# Patient Record
Sex: MALE | Race: OTHER | HISPANIC OR LATINO | URBAN - METROPOLITAN AREA
[De-identification: names, ages, dates, MRNs, and addresses within clinical notes are randomized per-mention and may not be internally consistent; named-entity substitution may affect disease eponyms.]

---

## 2024-05-28 ENCOUNTER — TELEMEDICINE (OUTPATIENT)
Dept: MEDICAL GROUP | Age: 24
End: 2024-05-28
Payer: COMMERCIAL

## 2024-05-28 VITALS — BODY MASS INDEX: 26.6 KG/M2 | WEIGHT: 190 LBS | HEIGHT: 71 IN | RESPIRATION RATE: 18 BRPM

## 2024-05-28 DIAGNOSIS — E78.00 PURE HYPERCHOLESTEROLEMIA: ICD-10-CM

## 2024-05-28 DIAGNOSIS — Z00.00 ENCOUNTER FOR MEDICAL EXAMINATION TO ESTABLISH CARE: ICD-10-CM

## 2024-05-28 DIAGNOSIS — Z00.00 ANNUAL PHYSICAL EXAM: ICD-10-CM

## 2024-05-28 PROCEDURE — 99203 OFFICE O/P NEW LOW 30 MIN: CPT | Performed by: FAMILY MEDICINE

## 2024-05-28 RX ORDER — METHYLPHENIDATE HYDROCHLORIDE 54 MG/1
54 TABLET ORAL
COMMUNITY
Start: 2024-05-21

## 2024-05-28 RX ORDER — METHYLPHENIDATE HYDROCHLORIDE 20 MG/1
20 TABLET ORAL
COMMUNITY
Start: 2024-05-21

## 2024-05-28 RX ORDER — FLUOXETINE HYDROCHLORIDE 20 MG/1
20 CAPSULE ORAL DAILY
COMMUNITY
Start: 2024-04-15

## 2024-05-28 ASSESSMENT — PATIENT HEALTH QUESTIONNAIRE - PHQ9
SUM OF ALL RESPONSES TO PHQ QUESTIONS 1-9: 5
5. POOR APPETITE OR OVEREATING: 0 - NOT AT ALL
CLINICAL INTERPRETATION OF PHQ2 SCORE: 2

## 2024-05-28 NOTE — PROGRESS NOTES
"Virtual Visit: Established Patient   This visit was conducted via Zoom using secure and encrypted videoconferencing technology.   The patient was in their home in the [facility].    The patient's identity was confirmed and verbal consent was obtained for this virtual visit.     Subjective:   CC:   Chief Complaint   Patient presents with    [facility]     NEW TO DEVIN Grimes is a 23 y.o. male presenting for evaluation and management of: [facility] ovidio Abebe is a very pleasant 23-year-old male who presents to clinic to [facility].  He has a significant past medical history of ADHD and depression.    Today the patient denied any fevers, chills, headaches, nausea, vomiting, changes in vision, shortness of breath, back pain, chest pain, nausea or vomiting, change in taste or smell, new rashes, joint pain, blood in stool dark tarry stool.      Past medical history see above and below    Family History of Cancer---none    Family History of early CAD (female for the age of 65, or a male before the age of 55 )---none      Social History        Occupation----works at [employer]    Exercise---at times      Pets---1 dog named pepper Dawson    Favorite sports team--- boxing      ROS   See above and below    Current medicines (including changes today)  Current Outpatient Medications   Medication Sig Dispense Refill    FLUoxetine (PROZAC) 20 MG Cap Take 20 mg by mouth every day.      methylphenidate (RITALIN) 20 MG tablet Take 20 mg by mouth.      methylphenidate (CONCERTA) 54 MG CR tablet Take 54 mg by mouth.       No current facility-administered medications for this visit.       There are no problems to display for this patient.       Objective:   Resp 18   Ht 1.803 m (5' 11\")   Wt 86.2 kg (190 lb)   BMI 26.50 kg/m²     Physical Exam:  Constitutional: Alert, no distress, well-groomed.  Skin: No rashes in visible areas.  Eye: Round. Conjunctiva clear, lids normal. No icterus.   ENMT: Lips pink " without lesions, good dentition, moist mucous membranes. Phonation normal.  Neck: No masses, no thyromegaly. Moves freely without pain.  Respiratory: Unlabored respiratory effort, no cough or audible wheeze  Psych: Alert and oriented x3, normal affect and mood.     Assessment and Plan:   The following treatment plan was discussed:     1. Encounter for medical examination to establish care    Care has been established  We need  updated l abs to establish a clinical profile      Age-appropriate preventive services recommended by USPTF guidelines and ACIP were discussed today.  Requested any outside Medical records to be sent to us    Discussed seat belt safety    2. Annual labs  - CBC WITH DIFFERENTIAL; Future  - Comp Metabolic Panel; Future  - Lipid Profile; Future  - TSH+FREE T4  - T3 FREE; Future  - HEP C VIRUS ANTIBODY; Future  - HIV AG/AB COMBO ASSAY SCREENING; Future    3. Pure hypercholesterolemia  - CBC WITH DIFFERENTIAL; Future  - Comp Metabolic Panel; Future  - Lipid Profile; Future  - TSH+FREE T4  - T3 FREE; Future  - HEP C VIRUS ANTIBODY; Future  - HIV AG/AB COMBO ASSAY SCREENING; Future    Other orders  - FLUoxetine (PROZAC) 20 MG Cap; Take 20 mg by mouth every day.  - methylphenidate (RITALIN) 20 MG tablet; Take 20 mg by mouth.  - methylphenidate (CONCERTA) 54 MG CR tablet; Take 54 mg by mouth.      Follow-up: Return in about 6 months (around 11/28/2024) for Reevaluation, labs.

## 2024-07-15 DIAGNOSIS — F41.1 GAD (GENERALIZED ANXIETY DISORDER): ICD-10-CM

## 2024-07-15 RX ORDER — FLUOXETINE HYDROCHLORIDE 40 MG/1
40 CAPSULE ORAL DAILY
Qty: 90 CAPSULE | Refills: 1 | Status: SHIPPED | OUTPATIENT
Start: 2024-07-15

## 2024-07-25 DIAGNOSIS — F90.2 ATTENTION DEFICIT HYPERACTIVITY DISORDER (ADHD), COMBINED TYPE: ICD-10-CM

## 2024-07-25 RX ORDER — METHYLPHENIDATE HYDROCHLORIDE 54 MG/1
54 TABLET ORAL EVERY MORNING
Qty: 90 TABLET | Refills: 0 | Status: SHIPPED | OUTPATIENT
Start: 2024-07-25 | End: 2024-07-30 | Stop reason: SDUPTHER

## 2024-07-30 DIAGNOSIS — F90.2 ATTENTION DEFICIT HYPERACTIVITY DISORDER (ADHD), COMBINED TYPE: ICD-10-CM

## 2024-07-30 RX ORDER — METHYLPHENIDATE HYDROCHLORIDE 54 MG/1
54 TABLET ORAL EVERY MORNING
Qty: 30 TABLET | Refills: 0 | Status: SHIPPED | OUTPATIENT
Start: 2024-09-28 | End: 2024-10-28

## 2024-07-30 RX ORDER — METHYLPHENIDATE HYDROCHLORIDE 54 MG/1
54 TABLET ORAL EVERY MORNING
Qty: 90 TABLET | Refills: 0 | Status: CANCELLED | OUTPATIENT
Start: 2024-07-30 | End: 2024-10-28

## 2024-07-30 RX ORDER — METHYLPHENIDATE HYDROCHLORIDE 54 MG/1
54 TABLET ORAL EVERY MORNING
Qty: 30 TABLET | Refills: 0 | Status: SHIPPED | OUTPATIENT
Start: 2024-08-01 | End: 2024-08-31

## 2024-07-30 RX ORDER — METHYLPHENIDATE HYDROCHLORIDE 54 MG/1
54 TABLET ORAL EVERY MORNING
Qty: 30 TABLET | Refills: 0 | Status: SHIPPED | OUTPATIENT
Start: 2024-07-30 | End: 2024-08-29

## 2024-08-09 RX ORDER — METHYLPHENIDATE HYDROCHLORIDE 20 MG/1
20 TABLET ORAL
Qty: 60 TABLET | OUTPATIENT
Start: 2024-08-09

## 2024-08-09 RX ORDER — METHYLPHENIDATE HYDROCHLORIDE 20 MG/1
20 TABLET ORAL
Qty: 60 TABLET | Status: CANCELLED | OUTPATIENT
Start: 2024-08-09

## 2024-08-16 ENCOUNTER — OFFICE VISIT (OUTPATIENT)
Dept: MEDICAL GROUP | Age: 24
End: 2024-08-16
Payer: COMMERCIAL

## 2024-08-16 VITALS
HEIGHT: 70 IN | SYSTOLIC BLOOD PRESSURE: 124 MMHG | WEIGHT: 189 LBS | DIASTOLIC BLOOD PRESSURE: 68 MMHG | RESPIRATION RATE: 18 BRPM | HEART RATE: 71 BPM | OXYGEN SATURATION: 98 % | TEMPERATURE: 97.5 F | BODY MASS INDEX: 27.06 KG/M2

## 2024-08-16 DIAGNOSIS — F90.2 ATTENTION DEFICIT HYPERACTIVITY DISORDER (ADHD), COMBINED TYPE: ICD-10-CM

## 2024-08-16 DIAGNOSIS — Z23 NEED FOR VACCINATION: ICD-10-CM

## 2024-08-16 PROCEDURE — 99214 OFFICE O/P EST MOD 30 MIN: CPT | Mod: 25 | Performed by: FAMILY MEDICINE

## 2024-08-16 PROCEDURE — 3074F SYST BP LT 130 MM HG: CPT | Performed by: FAMILY MEDICINE

## 2024-08-16 PROCEDURE — 90472 IMMUNIZATION ADMIN EACH ADD: CPT | Performed by: FAMILY MEDICINE

## 2024-08-16 PROCEDURE — 90715 TDAP VACCINE 7 YRS/> IM: CPT | Performed by: FAMILY MEDICINE

## 2024-08-16 PROCEDURE — 90651 9VHPV VACCINE 2/3 DOSE IM: CPT | Performed by: FAMILY MEDICINE

## 2024-08-16 PROCEDURE — 90471 IMMUNIZATION ADMIN: CPT | Performed by: FAMILY MEDICINE

## 2024-08-16 PROCEDURE — 3078F DIAST BP <80 MM HG: CPT | Performed by: FAMILY MEDICINE

## 2024-08-16 RX ORDER — METHYLPHENIDATE HYDROCHLORIDE 20 MG/1
20 TABLET ORAL 2 TIMES DAILY
Qty: 60 TABLET | Refills: 0 | Status: SHIPPED | OUTPATIENT
Start: 2024-08-16 | End: 2024-09-15

## 2024-08-16 RX ORDER — METHYLPHENIDATE HYDROCHLORIDE 20 MG/1
20 TABLET ORAL 2 TIMES DAILY
Qty: 60 TABLET | Refills: 0 | Status: SHIPPED | OUTPATIENT
Start: 2024-10-14 | End: 2024-11-13

## 2024-08-16 RX ORDER — METHYLPHENIDATE HYDROCHLORIDE 20 MG/1
20 TABLET ORAL 2 TIMES DAILY
Qty: 60 TABLET | Refills: 0 | Status: SHIPPED | OUTPATIENT
Start: 2024-09-15 | End: 2024-10-15

## 2024-08-16 NOTE — PROGRESS NOTES
This medical record contains text that has been entered with the assistance of computer voice recognition and dictation software.  Therefore, it may contain unintended errors in text, spelling, punctuation, or grammar      Verbal consent was acquired by the patient to use [employer] ambient listening note generation during this visit Yes       Chief Complaint   Patient presents with    Medication Refill     Med review and refills          Andrae Grimes is a 23 y.o. male here evaluation and management of: medication management      HPI:     History of Present Illness  The patient is a 23-year-old male who presents for medication refill.    He is currently on a regimen of Concerta, taking 54 mg in the morning and 20 mg in the afternoon. This medication schedule is proving to be effective for him.      1. Attention deficit hyperactivity disorder (ADHD), combined type  He has been using Concerta for many years with good results.  He has not had any adverse side effect such as anxiety, insomnia, palpitations chest pain or blood pressure.  Overall allows him to focus at work and at home.  He denies any fevers chills night sweats no shortness of breath no nausea or vomiting.        2. Need for vaccination  Due for HPV and Tdap    Current medicines (including changes today)  Current Outpatient Medications   Medication Sig Dispense Refill    methylphenidate (RITALIN) 20 MG tablet Take 1 Tablet by mouth 2 times a day for 30 days. 60 Tablet 0    [START ON 9/15/2024] methylphenidate (RITALIN) 20 MG tablet Take 1 Tablet by mouth 2 times a day for 30 days. 60 Tablet 0    [START ON 10/14/2024] methylphenidate (RITALIN) 20 MG tablet Take 1 Tablet by mouth 2 times a day for 30 days. 60 Tablet 0    methylphenidate (CONCERTA) 54 MG CR tablet Take 1 Tablet by mouth every morning for 30 days. 30 Tablet 0    methylphenidate (CONCERTA) 54 MG CR tablet Take 1 Tablet by mouth every morning for 30 days. 30 Tablet 0    [START ON  "9/28/2024] methylphenidate (CONCERTA) 54 MG CR tablet Take 1 Tablet by mouth every morning for 30 days. 30 Tablet 0    FLUoxetine (PROZAC) 40 MG capsule Take 1 Capsule by mouth every day. 90 Capsule 1     No current facility-administered medications for this visit.     He  has no past medical history on file.  He  has no past surgical history on file.  Social History     Tobacco Use    Smoking status: Never     Passive exposure: Never    Smokeless tobacco: Never   Substance Use Topics    Alcohol use: Yes     Comment: OCCASIONALLY    Drug use: Not Currently     Social History     Social History Narrative    Not on file     History reviewed. No pertinent family history.  No family status information on file.         ROS    The pertinent  ROS findings can be seen in the HPI above.     All other systems reviewed and are negative     Objective:     /68 (BP Location: Left arm, Patient Position: Sitting, BP Cuff Size: Adult)   Pulse 71   Temp 36.4 °C (97.5 °F) (Temporal)   Resp 18   Ht 1.778 m (5' 10\")   Wt 85.7 kg (189 lb)   SpO2 98%  Body mass index is 27.12 kg/m².      Physical Exam:    Constitutional: Alert, no distress.  Skin: No suspicious lesions  Eye: Equal, round and reactive, conjunctiva clear, lids normal.  ENMT: Lips without lesions, good dentition, oropharynx clear.  Neck: Trachea midline, no masses, no thyromegaly. No cervical or supraclavicular lymphadenopathy.  Respiratory: Unlabored respiratory effort, lungs clear to auscultation, no wheezes, no ronchi.  Cardiovascular: Normal S1, S2, no murmur, no edema  Abdomen: Soft, non-tender, no masses, no hepatosplenomegaly.        Assessment and Plan:   The following treatment plan was discussed    All recent labs and provider notes reviewed    1. Attention deficit hyperactivity disorder (ADHD), combined type    Patient has been stable with current management  We will make no changes for now    - methylphenidate (RITALIN) 20 MG tablet; Take 1 Tablet by " mouth 2 times a day for 30 days.  Dispense: 60 Tablet; Refill: 0  - methylphenidate (RITALIN) 20 MG tablet; Take 1 Tablet by mouth 2 times a day for 30 days.  Dispense: 60 Tablet; Refill: 0  - methylphenidate (RITALIN) 20 MG tablet; Take 1 Tablet by mouth 2 times a day for 30 days.  Dispense: 60 Tablet; Refill: 0    2. Need for vaccination  - 9VHPV Vaccine 2-3 Dose (GARDASIL 9)  - Tdap Vaccine =>8YO IM             Instructed to Follow up in clinic or ER for worsening symptoms, difficulty breathing, lack of expected recovery, or should new symptoms or problems arise.    Followup: Return in about 3 months (around 11/16/2024) for Medication refill.

## 2024-09-19 DIAGNOSIS — F90.2 ATTENTION DEFICIT HYPERACTIVITY DISORDER (ADHD), COMBINED TYPE: ICD-10-CM

## 2024-09-19 RX ORDER — METHYLPHENIDATE HYDROCHLORIDE 54 MG/1
54 TABLET ORAL EVERY MORNING
Qty: 30 TABLET | Refills: 0 | Status: SHIPPED | OUTPATIENT
Start: 2024-09-28 | End: 2024-10-28

## 2024-11-07 DIAGNOSIS — F90.2 ATTENTION DEFICIT HYPERACTIVITY DISORDER (ADHD), COMBINED TYPE: ICD-10-CM

## 2024-11-07 RX ORDER — METHYLPHENIDATE HYDROCHLORIDE 54 MG/1
54 TABLET ORAL EVERY MORNING
Qty: 30 TABLET | Refills: 0 | Status: SHIPPED | OUTPATIENT
Start: 2024-11-07 | End: 2024-12-07

## 2024-12-24 DIAGNOSIS — F90.2 ATTENTION DEFICIT HYPERACTIVITY DISORDER (ADHD), COMBINED TYPE: ICD-10-CM

## 2024-12-24 RX ORDER — METHYLPHENIDATE HYDROCHLORIDE 54 MG/1
54 TABLET ORAL EVERY MORNING
Qty: 30 TABLET | Refills: 0 | Status: SHIPPED | OUTPATIENT
Start: 2024-12-24 | End: 2025-01-23

## 2024-12-24 NOTE — TELEPHONE ENCOUNTER
Received request via: Patient    Was the patient seen in the last year in this department? Yes    Does the patient have an active prescription (recently filled or refills available) for medication(s) requested? No    [facility]     Does the patient have [facility] Plus and need 100-day supply? (This applies to ALL medications) Patient does not have SCP

## 2025-02-07 DIAGNOSIS — F41.1 GAD (GENERALIZED ANXIETY DISORDER): ICD-10-CM

## 2025-02-07 NOTE — TELEPHONE ENCOUNTER
Received request via: Patient    Was the patient seen in the last year in this department? Yes    Does the patient have an active prescription (recently filled or refills available) for medication(s) requested?  YES    [facility]    Does the patient have [facility] Plus and need 100-day supply? (This applies to ALL medications) Patient does not have SCP

## 2025-02-11 ENCOUNTER — TELEMEDICINE (OUTPATIENT)
Dept: MEDICAL GROUP | Age: 25
End: 2025-02-11
Payer: COMMERCIAL

## 2025-02-11 VITALS — WEIGHT: 189 LBS | BODY MASS INDEX: 27.06 KG/M2 | RESPIRATION RATE: 18 BRPM | HEIGHT: 70 IN

## 2025-02-11 DIAGNOSIS — F41.1 GAD (GENERALIZED ANXIETY DISORDER): ICD-10-CM

## 2025-02-11 DIAGNOSIS — E78.2 MIXED HYPERLIPIDEMIA: ICD-10-CM

## 2025-02-11 DIAGNOSIS — Z00.00 ANNUAL PHYSICAL EXAM: ICD-10-CM

## 2025-02-11 DIAGNOSIS — F90.2 ATTENTION DEFICIT HYPERACTIVITY DISORDER (ADHD), COMBINED TYPE: ICD-10-CM

## 2025-02-11 PROCEDURE — 99214 OFFICE O/P EST MOD 30 MIN: CPT | Mod: 95 | Performed by: FAMILY MEDICINE

## 2025-02-11 RX ORDER — FLUOXETINE HYDROCHLORIDE 40 MG/1
40 CAPSULE ORAL DAILY
Qty: 90 CAPSULE | Refills: 1 | Status: SHIPPED | OUTPATIENT
Start: 2025-02-11 | End: 2025-02-11 | Stop reason: SDUPTHER

## 2025-02-11 RX ORDER — METHYLPHENIDATE HYDROCHLORIDE 54 MG/1
54 TABLET ORAL EVERY MORNING
Qty: 90 TABLET | Refills: 0 | Status: SHIPPED | OUTPATIENT
Start: 2025-02-11 | End: 2025-02-13 | Stop reason: SDUPTHER

## 2025-02-11 RX ORDER — FLUOXETINE HYDROCHLORIDE 40 MG/1
40 CAPSULE ORAL DAILY
Qty: 90 CAPSULE | Refills: 2 | Status: SHIPPED | OUTPATIENT
Start: 2025-02-11

## 2025-02-11 ASSESSMENT — PATIENT HEALTH QUESTIONNAIRE - PHQ9: CLINICAL INTERPRETATION OF PHQ2 SCORE: 0

## 2025-02-11 NOTE — PROGRESS NOTES
"Virtual Visit: Established Patient   This visit was conducted via Teams using secure and encrypted videoconferencing technology.   The patient was in their home in the [facility].    The patient's identity was confirmed and verbal consent was obtained for this virtual visit.    Subjective:   CC:   Chief Complaint   Patient presents with    Medication Refill     Prozac and Concerta     Andrae Grimes is a 24 y.o. male presenting for evaluation and management of:    1. MAURICIO (generalized anxiety disorder)      2. Attention deficit hyperactivity disorder (ADHD), combined type    Andrae is a very pleasant 24-year-old male, who has been using Concerta for many years with good results.  He has not had any adverse side effect such as anxiety, insomnia, palpitations chest pain or blood pressure.  Overall allows him to focus at work and at home.  He denies any fevers chills night sweats no shortness of breath no nausea or vomiting.       ROS   See above    Current medicines (including changes today)  Current Outpatient Medications   Medication Sig Dispense Refill    fluoxetine (PROZAC) 40 MG capsule Take 1 Capsule by mouth every day. 90 Capsule 2    methylphenidate (CONCERTA) 54 MG CR tablet Take 1 Tablet by mouth every morning for 90 days. 90 Tablet 0     No current facility-administered medications for this visit.       There are no active problems to display for this patient.       Objective:   Resp 18   Ht 1.778 m (5' 10\")   Wt 85.7 kg (189 lb)   BMI 27.12 kg/m²     Physical Exam:  Constitutional: Alert, no distress, well-groomed.  Skin: No rashes in visible areas.  Eye: Round. Conjunctiva clear, lids normal. No icterus.   ENMT: Lips pink without lesions, good dentition, moist mucous membranes. Phonation normal.  Neck: No masses, no thyromegaly. Moves freely without pain.  Respiratory: Unlabored respiratory effort, no cough or audible wheeze  Psych: Alert and oriented x3, normal affect and mood.     Assessment " and Plan:   The following treatment plan was discussed:     1. MAURICIO (generalized anxiety disorder)    Patient has been stable with current management  We will make no changes for now    - fluoxetine (PROZAC) 40 MG capsule; Take 1 Capsule by mouth every day.  Dispense: 90 Capsule; Refill: 2    2. Attention deficit hyperactivity disorder (ADHD), combined type    Patient has been stable with current management  We will make no changes for now    - methylphenidate (CONCERTA) 54 MG CR tablet; Take 1 Tablet by mouth every morning for 90 days.  Dispense: 90 Tablet; Refill: 0    3. Mixed hyperlipidemia    We will obtain new labs to update clinical profile.  Then we will adjust therapy as needed.    - CBC WITH DIFFERENTIAL; Future  - Comp Metabolic Panel; Future  - Lipid Profile; Future  - TSH+FREE T4  - T3 FREE; Future  - VITAMIN D,25 HYDROXY (DEFICIENCY); Future  - HIV AG/AB COMBO ASSAY SCREENING; Future  - HEP C VIRUS ANTIBODY; Future    4. Annual labs    - CBC WITH DIFFERENTIAL; Future  - Comp Metabolic Panel; Future  - Lipid Profile; Future  - TSH+FREE T4  - T3 FREE; Future  - VITAMIN D,25 HYDROXY (DEFICIENCY); Future  - HIV AG/AB COMBO ASSAY SCREENING; Future  - HEP C VIRUS ANTIBODY; Future      Follow-up: Return in about 6 months (around 8/11/2025) for Medication refill.

## 2025-02-13 DIAGNOSIS — F90.2 ATTENTION DEFICIT HYPERACTIVITY DISORDER (ADHD), COMBINED TYPE: ICD-10-CM

## 2025-02-13 RX ORDER — METHYLPHENIDATE HYDROCHLORIDE 54 MG/1
54 TABLET ORAL EVERY MORNING
Qty: 30 TABLET | Refills: 0 | Status: SHIPPED | OUTPATIENT
Start: 2025-02-13 | End: 2025-03-15

## 2025-02-13 RX ORDER — METHYLPHENIDATE HYDROCHLORIDE 54 MG/1
54 TABLET ORAL EVERY MORNING
Qty: 30 TABLET | Refills: 0 | Status: SHIPPED | OUTPATIENT
Start: 2025-03-11 | End: 2025-04-10

## 2025-02-13 RX ORDER — METHYLPHENIDATE HYDROCHLORIDE 54 MG/1
54 TABLET ORAL EVERY MORNING
Qty: 30 TABLET | Refills: 0 | Status: SHIPPED | OUTPATIENT
Start: 2025-04-11 | End: 2025-05-11

## 2025-05-23 RX ORDER — TRAZODONE HYDROCHLORIDE 100 MG/1
100 TABLET ORAL NIGHTLY
COMMUNITY
End: 2025-05-23 | Stop reason: SDUPTHER

## 2025-05-23 RX ORDER — TRAZODONE HYDROCHLORIDE 100 MG/1
100 TABLET ORAL NIGHTLY
Qty: 90 TABLET | Refills: 3 | Status: SHIPPED | OUTPATIENT
Start: 2025-05-23

## 2025-08-19 DIAGNOSIS — F90.2 ATTENTION DEFICIT HYPERACTIVITY DISORDER (ADHD), COMBINED TYPE: ICD-10-CM

## 2025-08-20 RX ORDER — METHYLPHENIDATE HYDROCHLORIDE 54 MG/1
54 TABLET ORAL EVERY MORNING
Qty: 30 TABLET | Refills: 0 | Status: SHIPPED | OUTPATIENT
Start: 2025-08-20 | End: 2025-08-21 | Stop reason: SDUPTHER

## 2025-08-21 RX ORDER — METHYLPHENIDATE HYDROCHLORIDE 54 MG/1
54 TABLET ORAL EVERY MORNING
Qty: 30 TABLET | Refills: 0 | Status: SHIPPED | OUTPATIENT
Start: 2025-08-21 | End: 2025-09-20

## 2025-08-26 DIAGNOSIS — F41.1 GAD (GENERALIZED ANXIETY DISORDER): ICD-10-CM

## 2025-08-27 RX ORDER — FLUOXETINE HYDROCHLORIDE 40 MG/1
40 CAPSULE ORAL DAILY
Qty: 90 CAPSULE | Refills: 2 | Status: SHIPPED | OUTPATIENT
Start: 2025-08-27